# Patient Record
Sex: FEMALE | Race: WHITE | Employment: FULL TIME | URBAN - METROPOLITAN AREA
[De-identification: names, ages, dates, MRNs, and addresses within clinical notes are randomized per-mention and may not be internally consistent; named-entity substitution may affect disease eponyms.]

---

## 2018-05-15 ENCOUNTER — TELEPHONE (OUTPATIENT)
Dept: SLEEP MEDICINE | Age: 48
End: 2018-05-15

## 2018-06-01 ENCOUNTER — TELEPHONE (OUTPATIENT)
Dept: NEUROLOGY | Age: 48
End: 2018-06-01

## 2018-06-01 ENCOUNTER — OFFICE VISIT (OUTPATIENT)
Dept: NEUROLOGY | Age: 48
End: 2018-06-01

## 2018-06-01 VITALS
HEART RATE: 70 BPM | OXYGEN SATURATION: 97 % | BODY MASS INDEX: 23.25 KG/M2 | WEIGHT: 157 LBS | HEIGHT: 69 IN | SYSTOLIC BLOOD PRESSURE: 112 MMHG | DIASTOLIC BLOOD PRESSURE: 68 MMHG

## 2018-06-01 DIAGNOSIS — G93.31 POSTVIRAL FATIGUE SYNDROME: Primary | ICD-10-CM

## 2018-06-01 DIAGNOSIS — L93.0 LUPUS ERYTHEMATOSUS, UNSPECIFIED FORM: ICD-10-CM

## 2018-06-01 DIAGNOSIS — M79.7 FIBROMYALGIA: ICD-10-CM

## 2018-06-01 RX ORDER — MINERAL OIL
ENEMA (ML) RECTAL
COMMUNITY

## 2018-06-01 RX ORDER — NAPROXEN 250 MG/1
TABLET ORAL 2 TIMES DAILY WITH MEALS
COMMUNITY
End: 2019-03-15

## 2018-06-01 NOTE — LETTER
6/1/2018 1:56 PM 
 
Patient:  Mingo Plolard YOB: 1970 Date of Visit: 6/1/2018 Dear Samuel Sprague MD 
[address] [place] [address] [phone] VIA In Basket 
 : Thank you for referring Ms. Flavia Morris to me for evaluation/treatment. Below are the relevant portions of my assessment and plan of care. HISTORY OF PRESENT ILLNESS Mingo Pollard is a 50 y.o. female. HPI Comments: This patient is a 73-year-old right-handed  female who comes in today for extreme fatigue. She had a presumed viral gastritis about 1 month ago and now states she could sleep 20 hours a day if allowed to. 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 She used to have a lot of trouble falling asleep but now the moment she puts her head on the pillow she falls asleep. She sits down during the day she will fall asleep. She feels like her head is heavy all the time. She has a diagnosis of fibromyalgia, is on Celebrex for that. He is  she has 3 teenage children. She carries a diagnosis of lupus. New Patient The history is provided by the patient. This is a new problem. Review of Systems Constitutional:  
     Review of systems is positive for chest pain occasionally, depression, she just lost her mother and she is somewhat depressed but denies severe depression. Fatigue as noted above, occasional shortness of breath and occasional snoring. Complete review of systems done all others negative Current Outpatient Prescriptions on File Prior to Visit Medication Sig Dispense Refill  QUERCETIN DIHYDRATE, BULK, by Does Not Apply route.  b complex vitamins (B COMPLEX 1) tablet Take 1 Tab by mouth daily.  ASCORBATE CALCIUM (VITAMIN C PO) Take  by mouth.  nortriptyline (PAMELOR) 50 mg capsule  ascorbic acid (VITAMIN C) 250 mg tablet Take  by mouth.  celecoxib (CELEBREX) 200 mg capsule   5  
 Cetirizine (ZYRTEC) 10 mg cap Take  by mouth.  multivitamin (ONE A DAY) tablet Take 1 Tab by mouth daily.  hydroxychloroquine (PLAQUENIL) 200 mg tablet Take 200 mg by mouth two (2) times a day. No current facility-administered medications on file prior to visit. Past Medical History:  
Diagnosis Date  Fibromyalgia  Lupus  Ovarian cyst   
 
Family History Problem Relation Age of Onset  Parkinson's Disease Mother [employer] Other Mother   
  fibromyalgia  Dementia Mother  Neuropathy Mother  Stroke Mother  Migraines Sister  Heart Disease Brother  Stroke Brother  Cancer Maternal Grandfather   
  lung  Heart Disease Maternal Grandfather  Stroke Maternal Grandfather  Cancer Paternal Grandmother   
  testicular  Cancer Paternal Grandfather   
  ovarian Social History Substance Use Topics  Smoking status: Never Smoker  Smokeless tobacco: Never Used  Alcohol use 2.4 oz/week 4 Standard drinks or equivalent per week /68  Pulse 70  Ht 5' 9\" (1.753 m)  Wt 157 lb (71.2 kg)  SpO2 97%  BMI 23.18 kg/m2 Physical Exam 
Constitutional: Oriented to person, place, and time, appears well-developed and well-nourished. No distress. HENT:  
Head: Normocephalic and atraumatic. Mouth/Throat: Oropharynx is clear and moist. No oropharyngeal exudate. Eyes: Conjunctivae and EOM are normal. Pupils are equal, round, and reactive to light. No scleral icterus. Neck: Normal range of motion. Neck supple. No thyromegaly present. Cardiovascular: Normal rate, regular rhythm and normal heart sounds. Musculoskeletal: Normal range of motion, exhibits no edema, tenderness or deformity. Lymphadenopathy: no cervical adenopathy. Neurological: Alert and oriented to person, place, and time. Normal strength and normal reflexes. Displays no atrophy and no tremor. No cranial nerve deficit or sensory deficit. Exhibits normal muscle tone.   Displays a negative Romberg sign, no seizure activity. Coordination normal, gait normal.  
No Babinski's sign on the right side. No Babinski's sign on the left side. Speech, language and mentation are normal 
Visual fields are full to confrontation, funduscopic exam reveals flat discs, the retina and vasculature are normal  
Skin: Skin is warm and dry. No rash noted, not diaphoretic. No erythema. Psychiatric: Normal mood and affect,  behavior is normal. Judgment and thought content normal.  
Vitals reviewed. ASSESSMENT and PLAN 
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 She is scheduled for a sleep study but it is not for 3 months. I am going to try to have my office call and see if we can get that moved up a little bit. I will check a sedimentation rate, ANEL and a myasthenia panel and a CK. I do not get a flavor from her of an actual sleep disorder but I would like to make sure we are not dealing with any alteration of progression into stage IV sleep. This patient is miserable and if she does not improve significantly over the next 15 days I will consider putting her on low-dose amphetamines during the day. I am  sure she is willing to take them and I did not bring it up in the office today. She carries a diagnosis of fibromyalgia but she does not seem to have a fibromyalgia personality. I have asked her to call us to go over her lab work in the middle of next week. At that time I should know when her sleep study is scheduled for. I have an appointment back to see her in 2 months. LUPUS Obvious this patient has some degree of autoimmune disease. I suspect that her predisposition towards autoimmune diseases at the root of the current problem. This note will not be viewable in [address] If you have questions, please do not hesitate to call me. I look forward to following Ms. Lucho Gr along with you. Sincerely, Amari Harden MD

## 2018-06-01 NOTE — TELEPHONE ENCOUNTER
Spoke with patient to let her known that Dr. Josh Danielle ordered another lab and I will mailing it to her. Patient understood.

## 2018-06-01 NOTE — PROGRESS NOTES
HISTORY OF PRESENT ILLNESS  Flavia Toussaint is a 50 y.o. female. HPI Comments: This patient is a 73-year-old right-handed  female who comes in today for extreme fatigue. She had a presumed viral gastritis about 1 month ago and now states she could sleep 20 hours a day if allowed to. 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 She used to have a lot of trouble falling asleep but now the moment she puts her head on the pillow she falls asleep. She sits down during the day she will fall asleep. She feels like her head is heavy all the time. She has a diagnosis of fibromyalgia, is on Celebrex for that. He is  she has 3 teenage children. She carries a diagnosis of lupus. New Patient   The history is provided by the patient. This is a new problem. Review of Systems   Constitutional:        Review of systems is positive for chest pain occasionally, depression, she just lost her mother and she is somewhat depressed but denies severe depression. Fatigue as noted above, occasional shortness of breath and occasional snoring. Complete review of systems done all others negative     Current Outpatient Prescriptions on File Prior to Visit   Medication Sig Dispense Refill    QUERCETIN DIHYDRATE, BULK, by Does Not Apply route.  b complex vitamins (B COMPLEX 1) tablet Take 1 Tab by mouth daily.  ASCORBATE CALCIUM (VITAMIN C PO) Take  by mouth.  nortriptyline (PAMELOR) 50 mg capsule       ascorbic acid (VITAMIN C) 250 mg tablet Take  by mouth.  celecoxib (CELEBREX) 200 mg capsule   5    Cetirizine (ZYRTEC) 10 mg cap Take  by mouth.  multivitamin (ONE A DAY) tablet Take 1 Tab by mouth daily.  hydroxychloroquine (PLAQUENIL) 200 mg tablet Take 200 mg by mouth two (2) times a day. No current facility-administered medications on file prior to visit.       Past Medical History:   Diagnosis Date    Fibromyalgia     Lupus     Ovarian cyst      Family History Problem Relation Age of Onset    Parkinson's Disease Mother     Other Mother      fibromyalgia    Dementia Mother     Neuropathy Mother     Stroke Mother     Migraines Sister     Heart Disease Brother     Stroke Brother     Cancer Maternal Grandfather      lung    Heart Disease Maternal Grandfather     Stroke Maternal Grandfather     Cancer Paternal Grandmother      testicular    Cancer Paternal Grandfather      ovarian     Social History   Substance Use Topics    Smoking status: Never Smoker    Smokeless tobacco: Never Used    Alcohol use 2.4 oz/week     4 Standard drinks or equivalent per week     /68  Pulse 70  Ht 5' 9\" (1.753 m)  Wt 157 lb (71.2 kg)  SpO2 97%  BMI 23.18 kg/m2    Physical Exam  Constitutional: Oriented to person, place, and time, appears well-developed and well-nourished. No distress. HENT:   Head: Normocephalic and atraumatic. Mouth/Throat: Oropharynx is clear and moist. No oropharyngeal exudate. Eyes: Conjunctivae and EOM are normal. Pupils are equal, round, and reactive to light. No scleral icterus. Neck: Normal range of motion. Neck supple. No thyromegaly present. Cardiovascular: Normal rate, regular rhythm and normal heart sounds. Musculoskeletal: Normal range of motion, exhibits no edema, tenderness or deformity. Lymphadenopathy: no cervical adenopathy. Neurological: Alert and oriented to person, place, and time. Normal strength and normal reflexes. Displays no atrophy and no tremor. No cranial nerve deficit or sensory deficit. Exhibits normal muscle tone. Displays a negative Romberg sign, no seizure activity. Coordination normal, gait normal.   No Babinski's sign on the right side. No Babinski's sign on the left side. Speech, language and mentation are normal  Visual fields are full to confrontation, funduscopic exam reveals flat discs, the retina and vasculature are normal   Skin: Skin is warm and dry.  No rash noted, not diaphoretic. No erythema. Psychiatric: Normal mood and affect,  behavior is normal. Judgment and thought content normal.   Vitals reviewed. 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 She is scheduled for a sleep study but it is not for 3 months. I am going to try to have my office call and see if we can get that moved up a little bit. I will check a sedimentation rate, ANEL and a myasthenia panel and a CK. I do not get a flavor from her of an actual sleep disorder but I would like to make sure we are not dealing with any alteration of progression into stage IV sleep. This patient is miserable and if she does not improve significantly over the next 15 days I will consider putting her on low-dose amphetamines during the day. I am  sure she is willing to take them and I did not bring it up in the office today. She carries a diagnosis of fibromyalgia but she does not seem to have a fibromyalgia personality. I have asked her to call us to go over her lab work in the middle of next week. At that time I should know when her sleep study is scheduled for. I have an appointment back to see her in 2 months. LUPUS  Obvious this patient has some degree of autoimmune disease. I suspect that her predisposition towards autoimmune diseases at the root of the current problem. This note will not be viewable in [address]

## 2018-06-01 NOTE — MR AVS SNAPSHOT
[address], 
[address], [address] 
[phone] Patient: Raf Licona MRN: [id number] Visit Information Date & Time Provider Department Dept. Phone Encounter #  
 2018  1:00 PM Lavonne Woods MD [facility] at [facility] [phone] [id number] Your Appointments 2018 11:20 AM  
Follow Up with Lavonne Woods MD  
Neurology Clinic at [address]) Appt Note: f/u fatigue, jrb 18  
 [address] [place] [address], 
[address] [place] [address], [address]  
[phone]  
  
   
 [address] [place] [address], [address] [place] [address], [address] [place] [address] [phone]  
  
    
 2018  4:20 PM  
New Patient with Jeri Owen MD  
[address] [place] [address] ([address] [place] [address] Road) Appt Note: NP; referred by Dr. Latanya Vela;  
 [place] [address] [phone] [address] [facility] [address] [phone] Upcoming Health Maintenance Date Due DTaP/Tdap/Td series (1 - Tdap) 1989 PAP AKA CERVICAL CYTOLOGY 1989 Influenza Age 5 to Adult 2018 Allergies as of 2018  Review Complete On: 2018 By: Elda Abdul Severity Noted Reaction Type Reactions Latex, Natural Rubber  2012    Contact Dermatitis Adhesive  2015    Rash Morphine  2012    Rash Mushroom  2015    Rash, Other (comments) Rash and sore throat Peach Flavor  2015    Rash, Other (comments) Rash and sore throat Sulfa (Sulfonamide Antibiotics)  2012    Unknown (comments) Pt has lupus Tessalon [Benzonatate]  2015    Rash Tetracycline  2012    Unknown (comments) Pt has lupus Current Immunizations  Never Reviewed No immunizations on file. Not reviewed this visit You Were Diagnosed With   
  
 Codes Comments Postviral fatigue syndrome    -  Primary ICD-10-CM: G93.3 ICD-9-CM: 780.71 Fibromyalgia     ICD-10-CM: M79.7 ICD-9-CM: 729.1 Lupus erythematosus, unspecified form     ICD-10-CM: L93.0 ICD-9-CM: 695.4 Vitals BP Pulse Height(growth percentile) Weight(growth percentile) SpO2 BMI  
 112/68 70 5' 9\" (1.753 m) 157 lb (71.2 kg) 97% 23.18 kg/m2 OB Status Smoking Status Having regular periods Never Smoker BMI and BSA Data Body Mass Index Body Surface Area  
 23.18 kg/m 2 1.86 m 2 Preferred Pharmacy Pharmacy Name Phone [id number] [address] [phone] Your Updated Medication List  
  
   
This list is accurate as of 6/1/18  1:40 PM.  Always use your most recent med list.  
  
  
  
  
 ascorbic acid (vitamin C) 250 mg tablet Commonly known as:  VITAMIN C Take  by mouth. B COMPLEX 1 tablet Generic drug:  b complex vitamins Take 1 Tab by mouth daily. celecoxib 200 mg capsule Commonly known as:  CELEBREX  
  
 fexofenadine 180 mg tablet Commonly known as:  [place] Mayor Take  by mouth. GLUCOSAMINE PO Take  by mouth.  
  
 multivitamin tablet Commonly known as:  ONE A DAY Take 1 Tab by mouth daily. naproxen 250 mg tablet Commonly known as:  NAPROSYN Take  by mouth two (2) times daily (with meals). nortriptyline 50 mg capsule Commonly known as:  PAMELOR  
  
 PLAQUENIL 200 mg tablet Generic drug:  hydroxychloroquine Take 200 mg by mouth two (2) times a day. QUERCETIN DIHYDRATE (BULK)  
by Does Not Apply route. VITAMIN C PO Take  by mouth. VITAMIN D3 PO Take  by mouth. ZyrTEC 10 mg Cap Generic drug:  Cetirizine Take  by mouth. We Performed the Following CK [id number] CPT(R)] SED RATE (ESR) [id number] CPT(R)] T4, FREE [id number] CPT(R)] Patient Instructions Office Policies 
 
o Phone calls/patient messages: Please allow up to 24 hours for someone in the office to contact you about your call or message. Be mindful your provider may be out of the office or your message may require further review. We encourage you to use RealD for your messages as this is a faster, more efficient way to communicate with our office 
 
o Medication Refills: 
Prescription medications require up to 48 business hours to process. We encourage you to use RealD for your refills. For controlled medications: Please allow up to 72 business hours to process. Certain medications may require you to  a written prescription at our office. NO narcotic/controlled medications will be prescribed after 4pm Monday through Friday or on weekends 
 
o Form/Paperwork Completion: 
Please note there is a $25 fee for all paperwork completed by our providers. We ask that you allow 7-14 business days. Pre-payment is due prior to picking up/faxing the completed form. You may also download your forms to RealD to have your doctor print off. A Healthy Lifestyle: Care Instructions Your Care Instructions A healthy lifestyle can help you feel good, stay at a healthy weight, and have plenty of energy for both work and play. A healthy lifestyle is something you can share with your whole family. A healthy lifestyle also can lower your risk for serious health problems, such as high blood pressure, heart disease, and diabetes. You can follow a few steps listed below to improve your health and the health of your family. Follow-up care is a key part of your treatment and safety. Be sure to make and go to all appointments, and call your doctor if you are having problems. It's also a good idea to know your test results and keep a list of the medicines you take. How can you care for yourself at home? · Do not eat too much sugar, fat, or fast foods. You can still have dessert and treats now and then. The goal is moderation. · Start small to improve your eating habits. Pay attention to portion sizes, drink less juice and soda pop, and eat more fruits and vegetables. ¨ Eat a healthy amount of food. A 3-ounce serving of meat, for example, is about the size of a deck of cards. Fill the rest of your plate with vegetables and whole grains. ¨ Limit the amount of soda and sports drinks you have every day. Drink more water when you are thirsty. ¨ Eat at least 5 servings of fruits and vegetables every day. It may seem like a lot, but it is not hard to reach this goal. A serving or helping is 1 piece of fruit, 1 cup of vegetables, or 2 cups of leafy, raw vegetables. Have an apple or some carrot sticks as an afternoon snack instead of a candy bar. Try to have fruits and/or vegetables at every meal. 
· Make exercise part of your daily routine. You may want to start with simple activities, such as walking, bicycling, or slow swimming. Try to be active 30 to 60 minutes every day. You do not need to do all 30 to 60 minutes all at once. For example, you can exercise 3 times a day for 10 or 20 minutes. Moderate exercise is safe for most people, but it is always a good idea to talk to your doctor before starting an exercise program. 
· Keep moving. [employer] the lawn, work in the garden, or [employer]. Take the stairs instead of the elevator at work. · If you smoke, quit. People who smoke have an increased risk for heart attack, stroke, cancer, and other lung illnesses. Quitting is hard, but there are ways to boost your chance of quitting tobacco for good. ¨ Use nicotine gum, patches, or lozenges. ¨ Ask your doctor about stop-smoking programs and medicines. ¨ Keep trying.  
In addition to reducing your risk of diseases in the future, you will notice some benefits soon after you stop using tobacco. If you have shortness of breath or asthma symptoms, they will likely get better within a few weeks after you quit. · Limit how much alcohol you drink. Moderate amounts of alcohol (up to 2 drinks a day for men, 1 drink a day for women) are okay. But drinking too much can lead to liver problems, high blood pressure, and other health problems. Family health If you have a family, there are many things you can do together to improve your health. · Eat meals together as a family as often as possible. · Eat healthy foods. This includes fruits, vegetables, lean meats and dairy, and whole grains. · Include your family in your fitness plan. Most people think of activities such as jogging or tennis as the way to fitness, but there are many ways you and your family can be more active. Anything that makes you breathe hard and gets your heart pumping is exercise. Here are some tips: 
¨ Walk to do errands or to take your child to school or the bus. ¨ Go for a family bike ride after dinner instead of watching TV. Where can you learn more? Go to http://emilie-gabriella.info/. Enter M585 in the search box to learn more about \"A Healthy Lifestyle: Care Instructions. \" Current as of: May 12, 2017 Content Version: 11.4 © 1719-9266 Healthwise, [employer]. Care instructions adapted under license by [employer] (which disclaims liability or warranty for this information). If you have questions about a medical condition or this instruction, always ask your healthcare professional. [place] [address] any warranty or liability for your use of this information. Introducing [facility]! [facility] introduces Anywhere.FM patient portal. Now you can access parts of your medical record, email your doctor's office, and request medication refills online. 1. In your internet browser, go to [email or web address] 2. Click on the First Time User? Click Here link in the Sign In box. You will see the New Member Sign Up page. 3. Enter your Dissolve Access Code exactly as it appears below. You will not need to use this code after youve completed the sign-up process. If you do not sign up before the expiration date, you must request a new code. · Dissolve Access Code: [email or web address] Expires: 8/30/2018 12:58 PM 
 
4. Enter the last four digits of your Social Security Number (xxxx) and Date of Birth (mm/dd/yyyy) as indicated and click Submit. You will be taken to the next sign-up page. 5. Create a Dissolve ID. This will be your Dissolve login ID and cannot be changed, so think of one that is secure and easy to remember. 6. Create a Dissolve password. You can change your password at any time. 7. Enter your Password Reset Question and Answer. This can be used at a later time if you forget your password. 8. Enter your e-mail address. You will receive e-mail notification when new information is available in [address] 9. Click Sign Up. You can now view and download portions of your medical record. 10. Click the Download Summary menu link to download a portable copy of your medical information. If you have questions, please visit the Frequently Asked Questions section of the Dissolve website. Remember, Dissolve is NOT to be used for urgent needs. For medical emergencies, dial 911. Now available from your iPhone and Android! Please provide this summary of care documentation to your next provider. Your primary care clinician is listed as [facility]. If you have any questions after today's visit, please call [phone].

## 2018-06-01 NOTE — PATIENT INSTRUCTIONS
Office Policies    o Phone calls/patient messages:  Please allow up to 24 hours for someone in the office to contact you about your call or message. Be mindful your provider may be out of the office or your message may require further review. We encourage you to use [employer] for your messages as this is a faster, more efficient way to communicate with our office    o Medication Refills:  Prescription medications require up to 48 business hours to process. We encourage you to use [employer] for your refills. For controlled medications: Please allow up to 72 business hours to process. Certain medications may require you to  a written prescription at our office. NO narcotic/controlled medications will be prescribed after 4pm Monday through Friday or on weekends    o Form/Paperwork Completion:  Please note there is a $25 fee for all paperwork completed by our providers. We ask that you allow 7-14 business days. Pre-payment is due prior to picking up/faxing the completed form. You may also download your forms to [employer] to have your doctor print off. A Healthy Lifestyle: Care Instructions  Your Care Instructions    A healthy lifestyle can help you feel good, stay at a healthy weight, and have plenty of energy for both work and play. A healthy lifestyle is something you can share with your whole family. A healthy lifestyle also can lower your risk for serious health problems, such as high blood pressure, heart disease, and diabetes. You can follow a few steps listed below to improve your health and the health of your family. Follow-up care is a key part of your treatment and safety. Be sure to make and go to all appointments, and call your doctor if you are having problems. It's also a good idea to know your test results and keep a list of the medicines you take. How can you care for yourself at home? · Do not eat too much sugar, fat, or fast foods. You can still have dessert and treats now and then. The goal is moderation. · Start small to improve your eating habits. Pay attention to portion sizes, drink less juice and soda pop, and eat more fruits and vegetables. ¨ Eat a healthy amount of food. A 3-ounce serving of meat, for example, is about the size of a deck of cards. Fill the rest of your plate with vegetables and whole grains. ¨ Limit the amount of soda and sports drinks you have every day. Drink more water when you are thirsty. ¨ Eat at least 5 servings of fruits and vegetables every day. It may seem like a lot, but it is not hard to reach this goal. A serving or helping is 1 piece of fruit, 1 cup of vegetables, or 2 cups of leafy, raw vegetables. Have an apple or some carrot sticks as an afternoon snack instead of a candy bar. Try to have fruits and/or vegetables at every meal.  · Make exercise part of your daily routine. You may want to start with simple activities, such as walking, bicycling, or slow swimming. Try to be active 30 to 60 minutes every day. You do not need to do all 30 to 60 minutes all at once. For example, you can exercise 3 times a day for 10 or 20 minutes. Moderate exercise is safe for most people, but it is always a good idea to talk to your doctor before starting an exercise program.  · Keep moving. Howard Rehman the lawn, work in the garden, or [employer]. Take the stairs instead of the elevator at work. · If you smoke, quit. People who smoke have an increased risk for heart attack, stroke, cancer, and other lung illnesses. Quitting is hard, but there are ways to boost your chance of quitting tobacco for good. ¨ Use nicotine gum, patches, or lozenges. ¨ Ask your doctor about stop-smoking programs and medicines. ¨ Keep trying. In addition to reducing your risk of diseases in the future, you will notice some benefits soon after you stop using tobacco. If you have shortness of breath or asthma symptoms, they will likely get better within a few weeks after you quit.   · Limit how much alcohol you drink. Moderate amounts of alcohol (up to 2 drinks a day for men, 1 drink a day for women) are okay. But drinking too much can lead to liver problems, high blood pressure, and other health problems. Family health  If you have a family, there are many things you can do together to improve your health. · Eat meals together as a family as often as possible. · Eat healthy foods. This includes fruits, vegetables, lean meats and dairy, and whole grains. · Include your family in your fitness plan. Most people think of activities such as jogging or tennis as the way to fitness, but there are many ways you and your family can be more active. Anything that makes you breathe hard and gets your heart pumping is exercise. Here are some tips:  ¨ Walk to do errands or to take your child to school or the bus. ¨ Go for a family bike ride after dinner instead of watching TV. Where can you learn more? Go to http://emilie-gabriella.info/. Enter Z004 in the search box to learn more about \"A Healthy Lifestyle: Care Instructions. \"  Current as of: May 12, 2017  Content Version: 11.4  © 8348-8526 [employer]. Care instructions adapted under license by [employer] (which disclaims liability or warranty for this information). If you have questions about a medical condition or this instruction, always ask your healthcare professional. [address] any warranty or liability for your use of this information.

## 2018-06-02 LAB
CK SERPL-CCNC: 60 U/L (ref 24–173)
ERYTHROCYTE [SEDIMENTATION RATE] IN BLOOD BY WESTERGREN METHOD: 2 MM/HR (ref 0–32)
T4 FREE SERPL-MCNC: 1.21 NG/DL (ref 0.82–1.77)

## 2018-06-12 LAB
ACHR BIND AB SER-SCNC: <0.03 NMOL/L (ref 0–0.24)
STRIA MUS AB TITR SER IF: NEGATIVE {TITER}

## 2018-08-06 ENCOUNTER — HOSPITAL ENCOUNTER (OUTPATIENT)
Dept: ULTRASOUND IMAGING | Age: 48
Discharge: HOME | End: 2018-08-06
Attending: FAMILY MEDICINE
Payer: COMMERCIAL

## 2018-08-06 DIAGNOSIS — R60.0 PEDAL EDEMA: ICD-10-CM

## 2018-08-06 PROCEDURE — 93971 EXTREMITY STUDY: CPT

## 2019-03-15 ENCOUNTER — APPOINTMENT (OUTPATIENT)
Dept: CT IMAGING | Age: 49
End: 2019-03-15
Attending: PHYSICIAN ASSISTANT
Payer: COMMERCIAL

## 2019-03-15 ENCOUNTER — APPOINTMENT (OUTPATIENT)
Dept: ULTRASOUND IMAGING | Age: 49
End: 2019-03-15
Payer: COMMERCIAL

## 2019-03-15 ENCOUNTER — OFFICE VISIT (OUTPATIENT)
Dept: URGENT CARE | Age: 49
End: 2019-03-15

## 2019-03-15 ENCOUNTER — HOSPITAL ENCOUNTER (EMERGENCY)
Age: 49
Discharge: HOME | End: 2019-03-15
Attending: EMERGENCY MEDICINE
Payer: COMMERCIAL

## 2019-03-15 VITALS
DIASTOLIC BLOOD PRESSURE: 63 MMHG | HEART RATE: 66 BPM | OXYGEN SATURATION: 99 % | BODY MASS INDEX: 24.4 KG/M2 | RESPIRATION RATE: 18 BRPM | TEMPERATURE: 97.9 F | WEIGHT: 161 LBS | SYSTOLIC BLOOD PRESSURE: 138 MMHG | HEIGHT: 68 IN

## 2019-03-15 VITALS
BODY MASS INDEX: 24.39 KG/M2 | TEMPERATURE: 98.6 F | SYSTOLIC BLOOD PRESSURE: 128 MMHG | DIASTOLIC BLOOD PRESSURE: 58 MMHG | RESPIRATION RATE: 18 BRPM | HEIGHT: 68 IN | WEIGHT: 160.94 LBS | HEART RATE: 65 BPM | OXYGEN SATURATION: 97 %

## 2019-03-15 DIAGNOSIS — K59.00 CONSTIPATION, UNSPECIFIED CONSTIPATION TYPE: ICD-10-CM

## 2019-03-15 DIAGNOSIS — R10.11 ABDOMINAL PAIN, RIGHT UPPER QUADRANT: Primary | ICD-10-CM

## 2019-03-15 DIAGNOSIS — R10.9 ABDOMINAL PAIN, UNSPECIFIED ABDOMINAL LOCATION: Primary | ICD-10-CM

## 2019-03-15 LAB
ALBUMIN SERPL-MCNC: 4 G/DL (ref 3.5–5)
ALBUMIN/GLOB SERPL: 1.2 {RATIO} (ref 1.1–2.2)
ALP SERPL-CCNC: 59 U/L (ref 45–117)
ALT SERPL-CCNC: 30 U/L (ref 12–78)
ANION GAP SERPL CALC-SCNC: 7 MMOL/L (ref 5–15)
APPEARANCE UR: CLEAR
AST SERPL-CCNC: 20 U/L (ref 15–37)
BACTERIA URNS QL MICRO: NEGATIVE /HPF
BASOPHILS # BLD: 0.1 K/UL (ref 0–0.1)
BASOPHILS NFR BLD: 1 % (ref 0–1)
BILIRUB SERPL-MCNC: 0.4 MG/DL (ref 0.2–1)
BILIRUB UR QL CFM: NEGATIVE
BUN SERPL-MCNC: 12 MG/DL (ref 6–20)
BUN/CREAT SERPL: 16 (ref 12–20)
CALCIUM SERPL-MCNC: 8.8 MG/DL (ref 8.5–10.1)
CHLORIDE SERPL-SCNC: 106 MMOL/L (ref 97–108)
CO2 SERPL-SCNC: 25 MMOL/L (ref 21–32)
COLOR UR: ABNORMAL
CREAT SERPL-MCNC: 0.76 MG/DL (ref 0.55–1.02)
DIFFERENTIAL METHOD BLD: ABNORMAL
EOSINOPHIL # BLD: 0 K/UL (ref 0–0.4)
EOSINOPHIL NFR BLD: 0 % (ref 0–7)
EPITH CASTS URNS QL MICRO: ABNORMAL /LPF
ERYTHROCYTE [DISTWIDTH] IN BLOOD BY AUTOMATED COUNT: 12.6 % (ref 11.5–14.5)
GLOBULIN SER CALC-MCNC: 3.4 G/DL (ref 2–4)
GLUCOSE SERPL-MCNC: 100 MG/DL (ref 65–100)
GLUCOSE UR STRIP.AUTO-MCNC: NEGATIVE MG/DL
HCT VFR BLD AUTO: 42.6 % (ref 35–47)
HGB BLD-MCNC: 13.9 G/DL (ref 11.5–16)
HGB UR QL STRIP: NEGATIVE
IMM GRANULOCYTES # BLD AUTO: 0.1 K/UL (ref 0–0.04)
IMM GRANULOCYTES NFR BLD AUTO: 1 % (ref 0–0.5)
KETONES UR QL STRIP.AUTO: ABNORMAL MG/DL
LEUKOCYTE ESTERASE UR QL STRIP.AUTO: ABNORMAL
LIPASE SERPL-CCNC: 111 U/L (ref 73–393)
LYMPHOCYTES # BLD: 0.8 K/UL (ref 0.8–3.5)
LYMPHOCYTES NFR BLD: 6 % (ref 12–49)
MCH RBC QN AUTO: 28.7 PG (ref 26–34)
MCHC RBC AUTO-ENTMCNC: 32.6 G/DL (ref 30–36.5)
MCV RBC AUTO: 87.8 FL (ref 80–99)
MONOCYTES # BLD: 0.5 K/UL (ref 0–1)
MONOCYTES NFR BLD: 4 % (ref 5–13)
MUCOUS THREADS URNS QL MICRO: ABNORMAL /LPF
NEUTS SEG # BLD: 11.3 K/UL (ref 1.8–8)
NEUTS SEG NFR BLD: 88 % (ref 32–75)
NITRITE UR QL STRIP.AUTO: NEGATIVE
NRBC # BLD: 0.02 K/UL (ref 0–0.01)
NRBC BLD-RTO: 0.2 PER 100 WBC
PH UR STRIP: 6.5 [PH] (ref 5–8)
PLATELET # BLD AUTO: 244 K/UL (ref 150–400)
PMV BLD AUTO: 9.7 FL (ref 8.9–12.9)
POTASSIUM SERPL-SCNC: 3.5 MMOL/L (ref 3.5–5.1)
PROT SERPL-MCNC: 7.4 G/DL (ref 6.4–8.2)
PROT UR STRIP-MCNC: NEGATIVE MG/DL
RBC # BLD AUTO: 4.85 M/UL (ref 3.8–5.2)
RBC #/AREA URNS HPF: ABNORMAL /HPF (ref 0–5)
RBC MORPH BLD: ABNORMAL
SODIUM SERPL-SCNC: 138 MMOL/L (ref 136–145)
SP GR UR REFRACTOMETRY: 1.03 (ref 1–1.03)
UR CULT HOLD, URHOLD: NORMAL
UROBILINOGEN UR QL STRIP.AUTO: 1 EU/DL (ref 0.2–1)
WBC # BLD AUTO: 12.8 K/UL (ref 3.6–11)
WBC URNS QL MICRO: ABNORMAL /HPF (ref 0–4)

## 2019-03-15 PROCEDURE — 81001 URINALYSIS AUTO W/SCOPE: CPT

## 2019-03-15 PROCEDURE — 80053 COMPREHEN METABOLIC PANEL: CPT

## 2019-03-15 PROCEDURE — 74011250637 HC RX REV CODE- 250/637: Performed by: PHYSICIAN ASSISTANT

## 2019-03-15 PROCEDURE — 96374 THER/PROPH/DIAG INJ IV PUSH: CPT

## 2019-03-15 PROCEDURE — 99285 EMERGENCY DEPT VISIT HI MDM: CPT

## 2019-03-15 PROCEDURE — 74177 CT ABD & PELVIS W/CONTRAST: CPT

## 2019-03-15 PROCEDURE — 96376 TX/PRO/DX INJ SAME DRUG ADON: CPT

## 2019-03-15 PROCEDURE — 85025 COMPLETE CBC W/AUTO DIFF WBC: CPT

## 2019-03-15 PROCEDURE — 74011250636 HC RX REV CODE- 250/636: Performed by: PHYSICIAN ASSISTANT

## 2019-03-15 PROCEDURE — 99284 EMERGENCY DEPT VISIT MOD MDM: CPT

## 2019-03-15 PROCEDURE — 74011636320 HC RX REV CODE- 636/320: Performed by: PHYSICIAN ASSISTANT

## 2019-03-15 PROCEDURE — 83690 ASSAY OF LIPASE: CPT

## 2019-03-15 PROCEDURE — 96375 TX/PRO/DX INJ NEW DRUG ADDON: CPT

## 2019-03-15 PROCEDURE — 76705 ECHO EXAM OF ABDOMEN: CPT

## 2019-03-15 PROCEDURE — 36415 COLL VENOUS BLD VENIPUNCTURE: CPT

## 2019-03-15 RX ORDER — KETOROLAC TROMETHAMINE 30 MG/ML
30 INJECTION, SOLUTION INTRAMUSCULAR; INTRAVENOUS
Status: COMPLETED | OUTPATIENT
Start: 2019-03-15 | End: 2019-03-15

## 2019-03-15 RX ORDER — HYDROMORPHONE HYDROCHLORIDE 1 MG/ML
0.5 INJECTION, SOLUTION INTRAMUSCULAR; INTRAVENOUS; SUBCUTANEOUS ONCE
Status: COMPLETED | OUTPATIENT
Start: 2019-03-15 | End: 2019-03-15

## 2019-03-15 RX ORDER — ONDANSETRON 2 MG/ML
4 INJECTION INTRAMUSCULAR; INTRAVENOUS
Status: DISCONTINUED | OUTPATIENT
Start: 2019-03-15 | End: 2019-03-16 | Stop reason: HOSPADM

## 2019-03-15 RX ORDER — ONDANSETRON 4 MG/1
4 TABLET, ORALLY DISINTEGRATING ORAL
Qty: 1 TAB | Refills: 0 | Status: SHIPPED | COMMUNITY
Start: 2019-03-15 | End: 2019-03-15

## 2019-03-15 RX ORDER — METOCLOPRAMIDE HYDROCHLORIDE 5 MG/ML
10 INJECTION INTRAMUSCULAR; INTRAVENOUS
Status: COMPLETED | OUTPATIENT
Start: 2019-03-15 | End: 2019-03-15

## 2019-03-15 RX ORDER — HYDROMORPHONE HYDROCHLORIDE 1 MG/ML
0.2 INJECTION, SOLUTION INTRAMUSCULAR; INTRAVENOUS; SUBCUTANEOUS
Status: COMPLETED | OUTPATIENT
Start: 2019-03-15 | End: 2019-03-15

## 2019-03-15 RX ORDER — HYDROMORPHONE HYDROCHLORIDE 1 MG/ML
1 INJECTION, SOLUTION INTRAMUSCULAR; INTRAVENOUS; SUBCUTANEOUS ONCE
Status: DISCONTINUED | OUTPATIENT
Start: 2019-03-15 | End: 2019-03-15

## 2019-03-15 RX ORDER — FENTANYL CITRATE 50 UG/ML
75 INJECTION, SOLUTION INTRAMUSCULAR; INTRAVENOUS
Status: DISCONTINUED | OUTPATIENT
Start: 2019-03-15 | End: 2019-03-16 | Stop reason: HOSPADM

## 2019-03-15 RX ORDER — DICYCLOMINE HYDROCHLORIDE 20 MG/1
20 TABLET ORAL EVERY 6 HOURS
Qty: 20 TAB | Refills: 0 | Status: SHIPPED | OUTPATIENT
Start: 2019-03-15 | End: 2019-03-20

## 2019-03-15 RX ORDER — DOCUSATE SODIUM 100 MG/1
100 CAPSULE, LIQUID FILLED ORAL 2 TIMES DAILY
Qty: 28 CAP | Refills: 0 | Status: SHIPPED | OUTPATIENT
Start: 2019-03-15 | End: 2019-03-29

## 2019-03-15 RX ORDER — SODIUM CHLORIDE 0.9 % (FLUSH) 0.9 %
10 SYRINGE (ML) INJECTION
Status: COMPLETED | OUTPATIENT
Start: 2019-03-15 | End: 2019-03-15

## 2019-03-15 RX ORDER — DICYCLOMINE HYDROCHLORIDE 10 MG/1
20 CAPSULE ORAL
Status: COMPLETED | OUTPATIENT
Start: 2019-03-15 | End: 2019-03-15

## 2019-03-15 RX ORDER — PROMETHAZINE HYDROCHLORIDE 25 MG/1
25 TABLET ORAL
Qty: 12 TAB | Refills: 0 | Status: SHIPPED | OUTPATIENT
Start: 2019-03-15

## 2019-03-15 RX ORDER — OXYCODONE AND ACETAMINOPHEN 5; 325 MG/1; MG/1
1 TABLET ORAL
Qty: 12 TAB | Refills: 0 | Status: SHIPPED | OUTPATIENT
Start: 2019-03-15 | End: 2019-03-18

## 2019-03-15 RX ORDER — SODIUM CHLORIDE 9 MG/ML
1000 INJECTION, SOLUTION INTRAVENOUS CONTINUOUS
Status: DISCONTINUED | OUTPATIENT
Start: 2019-03-15 | End: 2019-03-16 | Stop reason: HOSPADM

## 2019-03-15 RX ADMIN — Medication 10 ML: at 20:53

## 2019-03-15 RX ADMIN — HYDROMORPHONE HYDROCHLORIDE 0.5 MG: 1 INJECTION, SOLUTION INTRAMUSCULAR; INTRAVENOUS; SUBCUTANEOUS at 19:35

## 2019-03-15 RX ADMIN — HYDROMORPHONE HYDROCHLORIDE 0.2 MG: 1 INJECTION, SOLUTION INTRAMUSCULAR; INTRAVENOUS; SUBCUTANEOUS at 17:53

## 2019-03-15 RX ADMIN — KETOROLAC TROMETHAMINE 30 MG: 30 INJECTION, SOLUTION INTRAMUSCULAR; INTRAVENOUS at 20:13

## 2019-03-15 RX ADMIN — DICYCLOMINE HYDROCHLORIDE 20 MG: 10 CAPSULE ORAL at 22:58

## 2019-03-15 RX ADMIN — IOPAMIDOL 100 ML: 755 INJECTION, SOLUTION INTRAVENOUS at 20:53

## 2019-03-15 RX ADMIN — METOCLOPRAMIDE 10 MG: 5 INJECTION, SOLUTION INTRAMUSCULAR; INTRAVENOUS at 17:53

## 2019-03-15 RX ADMIN — SODIUM CHLORIDE 1000 ML: 900 INJECTION, SOLUTION INTRAVENOUS at 19:35

## 2019-03-15 NOTE — ED NOTES
Patient presents to ED with complaints of right upper abdominal pain that started suddenly this morning. Patient went to urgent care and was sent to ED for gallbladder evaluation. Patient reports n/v/d. Denies urinary symptoms/blood in her urine. Patient reports hx of appendectomy. Patient states she is actively vomiting.      Last PO intake around 0800 this morning

## 2019-03-15 NOTE — ED NOTES
Bedside and Verbal shift change report given to Franky Torres RN (oncoming nurse) by Estephania Rios RN (offgoing nurse). Report included the following information SBAR, Kardex, ED Summary, MAR, Accordion and Recent Results.

## 2019-03-15 NOTE — PROGRESS NOTES
Pt was at rest and had sudden onset of pain in the Rt upper abd and with radiation in the Rt flank and some pain in the Rt LQ with NV. No injury, no FC, Some relief with voiding. No history of this in the past      The history is provided by the patient. Abdominal Pain    The history is provided by the patient. This is a new problem. The current episode started 3 to 5 hours ago. The problem has not changed (waxes and wanes) since onset. The pain is associated with vomiting. The pain is severe. Associated symptoms include nausea, vomiting and back pain (rt flank pain). Pertinent negatives include no fever, no belching, no diarrhea, no flatus, no dysuria, no frequency, no hematuria, no headaches and no trauma. Nothing worsens the pain. The pain is relieved by nothing. The patient's surgical history includes appendectomy. Vomiting    Associated symptoms include abdominal pain. Pertinent negatives include no fever, no diarrhea, no headaches and no headaches.         Past Medical History:   Diagnosis Date    Fibromyalgia     Lupus     Ovarian cyst         Past Surgical History:   Procedure Laterality Date    HX APPENDECTOMY  2005    HX BLADDER SUSPENSION  2011    HX BREAST AUGMENTATION  2009    HX BREAST BIOPSY Bilateral 2006    neg    HX CYST INCISION AND DRAINAGE Bilateral 1996    neg    HX OVARIAN CYST REMOVAL  1990    HX TONSILLECTOMY      IMPLANT BREAST SILICONE/EQ Bilateral 0872    US GUIDED CORE BREAST BIOPSY Left 2015    neg    US GUIDED CORE BREAST BIOPSY Left 2012    neg         Family History   Problem Relation Age of Onset    Parkinson's Disease Mother     Other Mother         fibromyalgia    Dementia Mother     Neuropathy Mother     Stroke Mother     Migraines Sister     Heart Disease Brother     Stroke Brother     Cancer Maternal Grandfather         lung    Heart Disease Maternal Grandfather     Stroke Maternal Grandfather     Cancer Paternal Grandmother         testicular    Cancer Paternal Grandfather         ovarian        Social History     Socioeconomic History    Marital status:      Spouse name: Not on file    Number of children: Not on file    Years of education: Not on file    Highest education level: Not on file   Social Needs    Financial resource strain: Not on file    Food insecurity - worry: Not on file    Food insecurity - inability: Not on file   [employer] needs - medical: Not on file   [employer] needs - non-medical: Not on file   Occupational History    Not on file   Tobacco Use    Smoking status: Never Smoker    Smokeless tobacco: Never Used   Substance and Sexual Activity    Alcohol use: Yes     Alcohol/week: 2.4 oz     Types: 4 Standard drinks or equivalent per week    Drug use: Not on file    Sexual activity: Not on file   Other Topics Concern    Not on file   Social History Narrative    Not on file                ALLERGIES: Latex, natural rubber; Adhesive; Morphine; Mushroom; Peach flavor; Sulfa (sulfonamide antibiotics); Tessalon [benzonatate]; and Tetracycline    Review of Systems   Constitutional: Negative. Negative for fever. Cardiovascular: Negative. Gastrointestinal: Positive for abdominal distention (abd bloating ), abdominal pain, nausea and vomiting. Negative for diarrhea and flatus. Genitourinary: Positive for flank pain. Negative for difficulty urinating, dyspareunia, dysuria, frequency and hematuria. Musculoskeletal: Positive for back pain (rt flank pain). Skin: Negative. Neurological: Negative for dizziness and headaches. Vitals:    03/15/19 1447   BP: 138/63   Pulse: 66   Resp: 18   Temp: 97.9 °F (36.6 °C)   SpO2: 99%   Weight: 161 lb (73 kg)   Height: 5' 8\" (1.727 m)       Physical Exam   Constitutional: She is oriented to person, place, and time. She appears well-developed and well-nourished. uncomfortable   HENT:   Head: Normocephalic and atraumatic. Mouth/Throat: No oropharyngeal exudate. Eyes: Conjunctivae and EOM are normal. Right eye exhibits no discharge. Left eye exhibits no discharge. No scleral icterus. Cardiovascular: Normal rate, regular rhythm and normal heart sounds. No murmur heard. Pulmonary/Chest: Effort normal and breath sounds normal. No respiratory distress. She has no wheezes. She has no rales. Abdominal: Soft. Bowel sounds are normal. She exhibits no distension. There is tenderness. There is no rebound and no guarding. RLQ pain with guarding. No CVA tenderness. RUQ tenderness without guarding. No masses. No left sided tenderness   Neurological: She is alert and oriented to person, place, and time. No cranial nerve deficit. Coordination normal.   Skin: Skin is warm. No erythema. Psychiatric: She has a normal mood and affect. Her behavior is normal. Judgment and thought content normal.   Nursing note and vitals reviewed. MDM     Differential Diagnosis; Clinical Impression; Plan:     Call to Dr Allison Espana at [facility] at 0897 and aware of pt and the concerns here      ICD-10-CM ICD-9-CM    1. Abdominal pain, unspecified abdominal location R10.9 789.00 CANCELED: AMB POC URINE PREGNANCY TEST, VISUAL COLOR COMPARISON      CANCELED: AMB POC URINALYSIS DIP STICK AUTO W/O MICRO     Medications Ordered Today   Medications    ondansetron (ZOFRAN ODT) 4 mg disintegrating tablet     Sig: Take 1 Tab by mouth now for 1 dose. Dispense:  1 Tab     Refill:  0     Order Specific Question:   Expiration Date     Answer:   4/30/2021     Order Specific Question:   Lot#     Answer:   [id number]     Order Specific Question:        Answer: [employer]     Order Specific Question:   NDC#     Answer:   [id number]     The pt is to go to the ER.                 Procedures

## 2019-03-15 NOTE — ED PROVIDER NOTES
EXPRESS CARE NOTE:  5:40 PM  I have seen and evaluated this patient in the Express Care portion of triage for severe RUQ pain and intractable N/V. The patients care will begin now and orders have been placed. This patient will be seen and provided further care in the Emergency Room. LILA Otto      EMERGENCY DEPARTMENT HISTORY AND PHYSICAL EXAM      Date: 3/15/2019  Patient Name: Micky Lerma    History of Presenting Illness     Chief Complaint   Patient presents with    Nausea    Vomiting       History Provided By: Patient    HPI: Micky Lerma, 46 y.o. female with PMHx significant for fibromyalgia, lupus, ovarian cyst, appendectomy, presents to the ED with cc of severe right upper quadrant abdominal pain onset at 11:00 today. The pain has been intermittent but has gradually become more severe throughout the day. Pain radiates to her right flank. There are no modifying factors. She has had associated nausea and numerous episodes of vomiting. She was seen at an urgent care facility and referred to the emergency department for further evaluation. She has never had pain like this before. She reports more frequent bowel movements over the last few days but denies diarrhea or constipation. She denies fever, urinary symptoms, CP, SOB, cough. She was treated for a UTI about a week ago with improvement in dysuria. PCP: Efren Saldana MD    There are no other complaints, changes, or physical findings at this time. Current Facility-Administered Medications   Medication Dose Route Frequency Provider Last Rate Last Dose    0.9% sodium chloride infusion 1,000 mL  1,000 mL IntraVENous CONTINUOUS Giovanny Welsh, [address]        HYDROmorphone (PF) (DILAUDID) injection 0.5 mg  0.5 mg IntraVENous ONCE Giovanny Welsh [address]         Current Outpatient Medications   Medication Sig Dispense Refill    ondansetron (ZOFRAN ODT) 4 mg disintegrating tablet Take 1 Tab by mouth now for 1 dose.  1 Tab 0    cholecalciferol, vitamin D3, (VITAMIN D3 PO) Take  by mouth.  fexofenadine (ALLEGRA) 180 mg tablet Take  by mouth.  glucosamine sulfate (GLUCOSAMINE PO) Take  by mouth.  QUERCETIN DIHYDRATE, BULK, by Does Not Apply route.  ascorbic acid (VITAMIN C) 250 mg tablet Take  by mouth.  Cetirizine (ZYRTEC) 10 mg cap Take  by mouth.  b complex vitamins (B COMPLEX 1) tablet Take 1 Tab by mouth daily.  ASCORBATE CALCIUM (VITAMIN C PO) Take  by mouth. Past History     Past Medical History:  Past Medical History:   Diagnosis Date    Fibromyalgia     Lupus     Ovarian cyst        Past Surgical History:  Past Surgical History:   Procedure Laterality Date    HX APPENDECTOMY  2005    HX BLADDER SUSPENSION  2011    HX BREAST AUGMENTATION  2009    HX BREAST BIOPSY Bilateral 2006    neg    HX CYST INCISION AND DRAINAGE Bilateral 1996    neg    HX OVARIAN CYST REMOVAL  1990    HX TONSILLECTOMY      IMPLANT BREAST SILICONE/EQ Bilateral 5604    US GUIDED CORE BREAST BIOPSY Left 2015    neg    US GUIDED CORE BREAST BIOPSY Left 2012    neg       Family History:  Family History   Problem Relation Age of Onset    Parkinson's Disease Mother     Other Mother         fibromyalgia    Dementia Mother     Neuropathy Mother    [facility] Stroke Mother     Migraines Sister     Heart Disease Brother     Stroke Brother     Cancer Maternal Grandfather         lung    Heart Disease Maternal Grandfather     Stroke Maternal Grandfather     Cancer Paternal Grandmother         testicular    Cancer Paternal Grandfather         ovarian       Social History:  Social History     Tobacco Use    Smoking status: Never Smoker    Smokeless tobacco: Never Used   Substance Use Topics    Alcohol use: Yes     Alcohol/week: 2.4 oz     Types: 4 Standard drinks or equivalent per week    Drug use: Not on file       Allergies:   Allergies   Allergen Reactions    Latex, Natural Rubber Contact Dermatitis    Adhesive Rash    Morphine Rash    Mushroom Rash and Other (comments)     Rash and sore throat    Peach Flavor Rash and Other (comments)     Rash and sore throat    Sulfa (Sulfonamide Antibiotics) Unknown (comments)     Pt has lupus    Tessalon [Benzonatate] Rash    Tetracycline Unknown (comments)     Pt has lupus         Review of Systems   Review of Systems   Constitutional: Negative for chills and fever. HENT: Negative for ear pain and sore throat. Eyes: Negative for redness and visual disturbance. Respiratory: Negative for cough and shortness of breath. Cardiovascular: Negative for chest pain and palpitations. Gastrointestinal: Positive for abdominal pain, nausea and vomiting. Negative for constipation and diarrhea. Genitourinary: Negative for dysuria and hematuria. Musculoskeletal: Negative for back pain and gait problem. Skin: Negative for rash and wound. Neurological: Negative for dizziness and headaches. Psychiatric/Behavioral: Negative for behavioral problems and confusion. All other systems reviewed and are negative. Physical Exam   Physical Exam   Constitutional: She is oriented to person, place, and time. She appears well-developed and well-nourished. Very uncomfortable appearing, sitting upright and clutching her abdomen. HENT:   Head: Normocephalic and atraumatic. Eyes: Conjunctivae and EOM are normal. Pupils are equal, round, and reactive to light. Neck: Normal range of motion. Neck supple. Cardiovascular: Normal rate, regular rhythm and normal heart sounds. Pulmonary/Chest: Effort normal and breath sounds normal.   Abdominal: She exhibits no distension. There is tenderness in the right upper quadrant. There is guarding. There is no CVA tenderness. Musculoskeletal: Normal range of motion. Neurological: She is alert and oriented to person, place, and time. She has normal strength. No cranial nerve deficit or sensory deficit. GCS eye subscore is 4. GCS verbal subscore is 5. GCS motor subscore is 6. Skin: Skin is warm and dry. No rash noted. Psychiatric: She has a normal mood and affect. Her behavior is normal.   Nursing note and vitals reviewed. Diagnostic Study Results     Labs -     Recent Results (from the past 12 hour(s))   CBC WITH AUTOMATED DIFF    Collection Time: 03/15/19  3:37 PM   Result Value Ref Range    WBC 12.8 (H) 3.6 - 11.0 K/uL    RBC 4.85 3.80 - 5.20 M/uL    HGB 13.9 11.5 - 16.0 g/dL    HCT 42.6 35.0 - 47.0 %    MCV 87.8 80.0 - 99.0 FL    MCH 28.7 26.0 - 34.0 PG    MCHC 32.6 30.0 - 36.5 g/dL    RDW 12.6 11.5 - 14.5 %    PLATELET 964 250 - 270 K/uL    MPV 9.7 8.9 - 12.9 FL    NRBC 0.2 (H) 0  WBC    ABSOLUTE NRBC 0.02 (H) 0.00 - 0.01 K/uL    NEUTROPHILS 88 (H) 32 - 75 %    LYMPHOCYTES 6 (L) 12 - 49 %    MONOCYTES 4 (L) 5 - 13 %    EOSINOPHILS 0 0 - 7 %    BASOPHILS 1 0 - 1 %    IMMATURE GRANULOCYTES 1 (H) 0.0 - 0.5 %    ABS. NEUTROPHILS 11.3 (H) 1.8 - 8.0 K/UL    ABS. LYMPHOCYTES 0.8 0.8 - 3.5 K/UL    ABS. MONOCYTES 0.5 0.0 - 1.0 K/UL    ABS. EOSINOPHILS 0.0 0.0 - 0.4 K/UL    ABS. BASOPHILS 0.1 0.0 - 0.1 K/UL    ABS. IMM. GRANS. 0.1 (H) 0.00 - 0.04 K/UL    DF AUTOMATED      RBC COMMENTS NORMOCYTIC, NORMOCHROMIC     METABOLIC PANEL, COMPREHENSIVE    Collection Time: 03/15/19  3:37 PM   Result Value Ref Range    Sodium 138 136 - 145 mmol/L    Potassium 3.5 3.5 - 5.1 mmol/L    Chloride 106 97 - 108 mmol/L    CO2 25 21 - 32 mmol/L    Anion gap 7 5 - 15 mmol/L    Glucose 100 65 - 100 mg/dL    BUN 12 6 - 20 MG/DL    Creatinine 0.76 0.55 - 1.02 MG/DL    BUN/Creatinine ratio 16 12 - 20      GFR est AA >60 >60 ml/min/1.73m2    GFR est non-AA >60 >60 ml/min/1.73m2    Calcium 8.8 8.5 - 10.1 MG/DL    Bilirubin, total 0.4 0.2 - 1.0 MG/DL    ALT (SGPT) 30 12 - 78 U/L    AST (SGOT) 20 15 - 37 U/L    Alk.  phosphatase 59 45 - 117 U/L    Protein, total 7.4 6.4 - 8.2 g/dL    Albumin 4.0 3.5 - 5.0 g/dL    Globulin 3.4 2.0 - 4.0 g/dL    A-G Ratio 1. 2 1.1 - 2.2     LIPASE    Collection Time: 03/15/19  3:37 PM   Result Value Ref Range    Lipase 111 73 - 393 U/L   URINALYSIS W/MICROSCOPIC    Collection Time: 03/15/19  4:25 PM   Result Value Ref Range    Color DARK YELLOW      Appearance CLEAR CLEAR      Specific gravity 1.029 1.003 - 1.030      pH (UA) 6.5 5.0 - 8.0      Protein NEGATIVE  NEG mg/dL    Glucose NEGATIVE  NEG mg/dL    Ketone TRACE (A) NEG mg/dL    Blood NEGATIVE  NEG      Urobilinogen 1.0 0.2 - 1.0 EU/dL    Nitrites NEGATIVE  NEG      Leukocyte Esterase TRACE (A) NEG      WBC 5-10 0 - 4 /hpf    RBC 5-10 0 - 5 /hpf    Epithelial cells FEW FEW /lpf    Bacteria NEGATIVE  NEG /hpf    Mucus 1+ (A) NEG /lpf   URINE CULTURE HOLD SAMPLE    Collection Time: 03/15/19  4:25 PM   Result Value Ref Range    Urine culture hold        URINE ON HOLD IN MICROBIOLOGY DEPT FOR 3 DAYS. IF UNPRESERVED URINE IS SUBMITTED, IT CANNOT BE USED FOR ADDITIONAL TESTING AFTER 24 HRS, RECOLLECTION WILL BE REQUIRED. BILIRUBIN, CONFIRM    Collection Time: 03/15/19  4:25 PM   Result Value Ref Range    Bilirubin UA, confirm NEGATIVE  NEG         Radiologic Studies -   CT ABD PELV W CONT   Final Result   IMPRESSION:    Moderate to large amount of colonic stool in the right hemicolon. No bowel   obstruction or other acute abnormality in the abdomen or pelvis. US ABD LTD   Final Result   IMPRESSION:      Normal right upper quadrant limited abdominal ultrasound. CT Results  (Last 48 hours)               03/15/19 2202  CT ABD PELV W CONT Final result    Impression:  IMPRESSION:    Moderate to large amount of colonic stool in the right hemicolon. No bowel   obstruction or other acute abnormality in the abdomen or pelvis. Narrative:  EXAM:  CT ABD PELV W CONT       INDICATION: Severe right upper abdomen pain. COMPARISON: Ultrasound 3/15/2019.        TECHNIQUE: Helical CT of the abdomen  and pelvis  following the uneventful   intravenous administration of nonionic contrast.  Coronal and sagittal reformats   are performed. CT dose reduction was achieved through use of a standardized   protocol tailored for this examination and automatic exposure control for dose   modulation. FINDINGS:    The visualized lung bases demonstrate no mass or consolidation. There is   bilateral dependent atelectasis. The heart size is normal. There is no   pericardial or pleural effusion. Bilateral breast prostheses are noted. The liver, spleen, pancreas, and adrenal glands are normal. The gall bladder is   present  without intra- or extra-hepatic biliary dilatation. The kidneys are symmetric without hydronephrosis. There is a moderate to large amount of stool in the right hemicolon. There are   no dilated bowel loops. The appendix is surgically absent. There is mild distal   colonic diverticulosis without focal adjacent inflammation. There are no enlarged lymph nodes. There is no free fluid or free air. The   aorta tapers without aneurysm. The urinary bladder is normal.  There is no pelvic mass. The bony structures are age-appropriate. CXR Results  (Last 48 hours)    None            Medical Decision Making   I am the first provider for this patient. I reviewed the vital signs, available nursing notes, past medical history, past surgical history, family history and social history. Vital Signs-Reviewed the patient's vital signs.   Patient Vitals for the past 12 hrs:   Temp Pulse Resp BP SpO2   03/15/19 1916 98.6 °F (37 °C) 65 18 128/58 97 %   03/15/19 1830    135/68 94 %   03/15/19 1800    147/86 97 %   03/15/19 1750    138/75    03/15/19 1525 97.9 °F (36.6 °C) 65 16 125/72 100 %         Records Reviewed: Nursing Notes and Old Medical Records    Provider Notes (Medical Decision Making):   Differential diagnosis includes acute cholecystitis, cholelithiasis, pyelonephritis, kidney stone, gastroenteritis, biliary dyskinesia    ED Course:   Initial assessment performed. The patients presenting problems have been discussed, and they are in agreement with the care plan formulated and outlined with them. I have encouraged them to ask questions as they arise throughout their visit. 10:30 PM - Patient was given multiple doses of analgesia and her pain had significantly improved by the end of her stay. I discussed test and imaging results with her. CT shows moderate to large amount of colonic stool in the right hemicolon, which certainly can explain her symptoms. I discussed that there is still a possibility of biliary dyskinesia but that constipation is more likely causing her pain today. Will prescribe Bentyl and stool softener. She is advised to follow-up with her PCP in 2 days for a recheck and to assess if she will need further workup with GI. We discussed return precautions. Critical Care Time:   none    DISCHARGE NOTE:  10:40 PM -    Flavia Morris's  results have been reviewed with her. She has been counseled regarding her diagnosis. She verbally conveys understanding and agreement of the signs, symptoms, diagnosis, treatment and prognosis and additionally agrees to follow up as recommended with Dr. Yamil Guevara MD in 24 - 48 hours. She also agrees with the care-plan and conveys that all of her questions have been answered. I have also put together some discharge instructions for her that include: 1) educational information regarding their diagnosis, 2) how to care for their diagnosis at home, as well a 3) list of reasons why they would want to return to the ED prior to their follow-up appointment, should their condition change. She and/or family's questions have been answered. I have encouraged them to see the official results in [facility]\" or to retrieve the specifics of their results from medical records. PLAN:  1. Return precautions as discussed  2.  Follow-up with providers as directed  3. Medications as prescribed    Return to ED if worse     Diagnosis     Clinical Impression:   1. Abdominal pain, right upper quadrant    2. Constipation, unspecified constipation type        Discharge Medication List as of 3/15/2019 10:40 PM      START taking these medications    Details   dicyclomine (BENTYL) 20 mg tablet Take 1 Tab by mouth every six (6) hours for 20 doses. , Normal, Disp-20 Tab, R-0      oxyCODONE-acetaminophen (PERCOCET) 5-325 mg per tablet Take 1 Tab by mouth every six (6) hours as needed for Pain for up to 3 days. Max Daily Amount: 4 Tabs., Print, Disp-12 Tab, R-0      promethazine (PHENERGAN) 25 mg tablet Take 1 Tab by mouth every six (6) hours as needed for Nausea., Normal, Disp-12 Tab, R-0      docusate sodium (COLACE) 100 mg capsule Take 1 Cap by mouth two (2) times a day for 14 days. , Normal, Disp-28 Cap, R-0         CONTINUE these medications which have NOT CHANGED    Details   ondansetron (ZOFRAN ODT) 4 mg disintegrating tablet Take 1 Tab by mouth now for 1 dose., Sample, Disp-1 Tab, R-0      cholecalciferol, vitamin D3, (VITAMIN D3 PO) Take  by mouth., Historical Med      fexofenadine (ALLEGRA) 180 mg tablet Take  by mouth., Historical Med      glucosamine sulfate (GLUCOSAMINE PO) Take  by mouth., Historical Med      QUERCETIN DIHYDRATE, BULK, by Does Not Apply route., Historical Med      !! ascorbic acid (VITAMIN C) 250 mg tablet Take  by mouth., Historical Med      Cetirizine (ZYRTEC) 10 mg cap Take  by mouth., Historical Med      b complex vitamins (B COMPLEX 1) tablet Take 1 Tab by mouth daily. , Historical Med      !! ASCORBATE CALCIUM (VITAMIN C PO) Take  by mouth., Historical Med       !! - Potential duplicate medications found. Please discuss with provider.           Follow-up Information     Follow up With Specialties Details Why Contact Info    Renan Anton MD Family Practice Go in 2 days for a recheck [address] [id number] [facility] Emergency Medicine Go to If symptoms worsen [address] [facility] [address]

## 2019-03-16 NOTE — DISCHARGE INSTRUCTIONS
Patient Education        Abdominal Pain: Care Instructions  Your Care Instructions    Abdominal pain has many possible causes. Some aren't serious and get better on their own in a few days. Others need more testing and treatment. If your pain continues or gets worse, you need to be rechecked and may need more tests to find out what is wrong. You may need surgery to correct the problem. Don't ignore new symptoms, such as fever, nausea and vomiting, urination problems, pain that gets worse, and dizziness. These may be signs of a more serious problem. Your doctor may have recommended a follow-up visit in the next 8 to 12 hours. If you are not getting better, you may need more tests or treatment. The doctor has checked you carefully, but problems can develop later. If you notice any problems or new symptoms, get medical treatment right away. Follow-up care is a key part of your treatment and safety. Be sure to make and go to all appointments, and call your doctor if you are having problems. It's also a good idea to know your test results and keep a list of the medicines you take. How can you care for yourself at home? · Rest until you feel better. · To prevent dehydration, drink plenty of fluids, enough so that your urine is light yellow or clear like water. Choose water and other caffeine-free clear liquids until you feel better. If you have kidney, heart, or liver disease and have to limit fluids, talk with your doctor before you increase the amount of fluids you drink. · If your stomach is upset, eat mild foods, such as rice, dry toast or crackers, bananas, and applesauce. Try eating several small meals instead of two or three large ones. · Wait until 48 hours after all symptoms have gone away before you have spicy foods, alcohol, and drinks that contain caffeine. · Do not eat foods that are high in fat. · Avoid anti-inflammatory medicines such as aspirin, ibuprofen (Advil, Motrin), and naproxen (Aleve). These can cause stomach upset. Talk to your doctor if you take daily aspirin for another health problem. When should you call for help? Call 911 anytime you think you may need emergency care. For example, call if:    · You passed out (lost consciousness).     · You pass maroon or very bloody stools.     · You vomit blood or what looks like coffee grounds.     · You have new, severe belly pain.    Call your doctor now or seek immediate medical care if:    · Your pain gets worse, especially if it becomes focused in one area of your belly.     · You have a new or higher fever.     · Your stools are black and look like tar, or they have streaks of blood.     · You have unexpected vaginal bleeding.     · You have symptoms of a urinary tract infection. These may include:  ? Pain when you urinate. ? Urinating more often than usual.  ? Blood in your urine.     · You are dizzy or lightheaded, or you feel like you may faint.    Watch closely for changes in your health, and be sure to contact your doctor if:    · You are not getting better after 1 day (24 hours). Where can you learn more? Go to http://emilieBagel Nashgabriella.info/. Enter F341 in the search box to learn more about \"Abdominal Pain: Care Instructions. \"  Current as of: September 23, 2018  Content Version: 11.9  © 7784-8108 [employer], [employer]. Care instructions adapted under license by [employer] (which disclaims liability or warranty for this information). If you have questions about a medical condition or this instruction, always ask your healthcare professional. [address] any warranty or liability for your use of this information. Patient Education        Constipation: Care Instructions  Your Care Instructions    Constipation means that you have a hard time passing stools (bowel movements). People pass stools from 3 times a day to once every 3 days. What is normal for you may be different. Constipation may occur with pain in the rectum and cramping. The pain may get worse when you try to pass stools. Sometimes there are small amounts of bright red blood on toilet paper or the surface of stools. This is because of enlarged veins near the rectum (hemorrhoids). A few changes in your diet and lifestyle may help you avoid ongoing constipation. Your doctor may also prescribe medicine to help loosen your stool. Some medicines can cause constipation. These include pain medicines and antidepressants. Tell your doctor about all the medicines you take. Your doctor may want to make a medicine change to ease your symptoms. Follow-up care is a key part of your treatment and safety. Be sure to make and go to all appointments, and call your doctor if you are having problems. It's also a good idea to know your test results and keep a list of the medicines you take. How can you care for yourself at home? · Drink plenty of fluids, enough so that your urine is light yellow or clear like water. If you have kidney, heart, or liver disease and have to limit fluids, talk with your doctor before you increase the amount of fluids you drink. · Include high-fiber foods in your diet each day. These include fruits, vegetables, beans, and whole grains. · Get at least 30 minutes of exercise on most days of the week. Walking is a good choice. You also may want to do other activities, such as running, swimming, cycling, or playing tennis or team sports. · Take a fiber supplement, such as Citrucel or Metamucil, every day. Read and follow all instructions on the label. · Schedule time each day for a bowel movement. A daily routine may help. Take your time having your bowel movement. · Support your feet with a small step stool when you sit on the toilet. This helps flex your hips and places your pelvis in a squatting position. · Your doctor may recommend an over-the-counter laxative to relieve your constipation.  Examples are Milk of Magnesia and MiraLax. Read and follow all instructions on the label. Do not use laxatives on a long-term basis. When should you call for help? Call your doctor now or seek immediate medical care if:    · You have new or worse belly pain.     · You have new or worse nausea or vomiting.     · You have blood in your stools.    Watch closely for changes in your health, and be sure to contact your doctor if:    · Your constipation is getting worse.     · You do not get better as expected. Where can you learn more? Go to http://emilie-gabriella.info/. Enter 21  in the search box to learn more about \"Constipation: Care Instructions. \"  Current as of: September 23, 2018  Content Version: 11.9  © 0424-4916 [employer]. Care instructions adapted under license by [employer] (which disclaims liability or warranty for this information). If you have questions about a medical condition or this instruction, always ask your healthcare professional. [address] any warranty or liability for your use of this information.

## 2019-03-27 ENCOUNTER — HOSPITAL ENCOUNTER (OUTPATIENT)
Dept: NUCLEAR MEDICINE | Age: 49
Discharge: HOME | End: 2019-03-27
Attending: NURSE PRACTITIONER
Payer: COMMERCIAL

## 2019-03-27 VITALS — WEIGHT: 156 LBS | BODY MASS INDEX: 23.72 KG/M2

## 2019-03-27 DIAGNOSIS — R10.10 PAIN OF UPPER ABDOMEN: ICD-10-CM

## 2019-03-27 PROCEDURE — 74011250636 HC RX REV CODE- 250/636

## 2019-03-27 PROCEDURE — 74011000258 HC RX REV CODE- 258

## 2019-03-27 PROCEDURE — 78227 HEPATOBIL SYST IMAGE W/DRUG: CPT

## 2019-03-27 RX ORDER — SODIUM CHLORIDE 0.9 % (FLUSH) 0.9 %
10 SYRINGE (ML) INJECTION
Status: COMPLETED | OUTPATIENT
Start: 2019-03-27 | End: 2019-03-27

## 2019-03-27 RX ORDER — SODIUM CHLORIDE 9 MG/ML
25 INJECTION, SOLUTION INTRAVENOUS
Status: COMPLETED | OUTPATIENT
Start: 2019-03-27 | End: 2019-03-27

## 2019-03-27 RX ADMIN — Medication 10 ML: at 12:53

## 2019-03-27 RX ADMIN — SINCALIDE 1.42 MCG: 5 INJECTION, POWDER, LYOPHILIZED, FOR SOLUTION INTRAVENOUS at 14:00

## 2019-03-27 RX ADMIN — SODIUM CHLORIDE 25 ML: 900 INJECTION, SOLUTION INTRAVENOUS at 14:00

## 2019-09-06 ENCOUNTER — TELEPHONE (OUTPATIENT)
Dept: INTERNAL MEDICINE CLINIC | Age: 49
End: 2019-09-06

## 2019-09-06 NOTE — TELEPHONE ENCOUNTER
Currently working to obtain approval for a refund of $124.28 for the patient due to a visit issue at [facility] with Dr Alyce Rangel on March 15th. Approval for refund pending.

## 2019-12-20 ENCOUNTER — HOSPITAL ENCOUNTER (OUTPATIENT)
Dept: CT IMAGING | Age: 49
Discharge: HOME | End: 2019-12-20
Attending: OTOLARYNGOLOGY
Payer: COMMERCIAL

## 2019-12-20 DIAGNOSIS — Z00.8 OTHER SPECIFIED GENERAL MEDICAL EXAMINATION: ICD-10-CM

## 2019-12-20 DIAGNOSIS — Z78.9 NON-SMOKER: ICD-10-CM

## 2019-12-20 DIAGNOSIS — J45.909 ASTHMA: ICD-10-CM

## 2019-12-20 DIAGNOSIS — R06.1 STRIDOR: ICD-10-CM

## 2019-12-20 PROCEDURE — 74011636320 HC RX REV CODE- 636/320: Performed by: OTOLARYNGOLOGY

## 2019-12-20 PROCEDURE — 70491 CT SOFT TISSUE NECK W/DYE: CPT

## 2019-12-20 RX ORDER — SODIUM CHLORIDE 0.9 % (FLUSH) 0.9 %
10 SYRINGE (ML) INJECTION
Status: COMPLETED | OUTPATIENT
Start: 2019-12-20 | End: 2019-12-20

## 2019-12-20 RX ADMIN — IOPAMIDOL 100 ML: 755 INJECTION, SOLUTION INTRAVENOUS at 10:06

## 2019-12-20 RX ADMIN — Medication 10 ML: at 10:06

## 2020-01-17 ENCOUNTER — OFFICE VISIT (OUTPATIENT)
Dept: SLEEP MEDICINE | Age: 50
End: 2020-01-17

## 2020-01-17 VITALS
DIASTOLIC BLOOD PRESSURE: 55 MMHG | SYSTOLIC BLOOD PRESSURE: 105 MMHG | WEIGHT: 162 LBS | HEIGHT: 68 IN | HEART RATE: 57 BPM | BODY MASS INDEX: 24.55 KG/M2 | TEMPERATURE: 98.8 F | OXYGEN SATURATION: 100 %

## 2020-01-17 DIAGNOSIS — G47.419 NARCOLEPSY WITHOUT CATAPLEXY: Primary | ICD-10-CM

## 2020-01-17 DIAGNOSIS — Z86.59 H/O: DEPRESSION: ICD-10-CM

## 2020-01-17 DIAGNOSIS — G47.50 PARASOMNIA, UNSPECIFIED TYPE: ICD-10-CM

## 2020-01-17 DIAGNOSIS — M79.7 FIBROMYALGIA: ICD-10-CM

## 2020-01-17 DIAGNOSIS — G47.8 SLEEP PARALYSIS: ICD-10-CM

## 2020-01-17 RX ORDER — ETODOLAC 200 MG/1
CAPSULE ORAL
COMMUNITY

## 2020-01-17 NOTE — PATIENT INSTRUCTIONS
217 [facility] [place]. [place], [address]  Tel.  [phone]  Fax. 100 [facility] 60  [place], [address] [facility]  Tel.  [phone]  Fax. [phone] [address]  Tel.  [phone]  Fax. [phone]     Sleep Apnea: After Your Visit  Your Care Instructions  Sleep apnea occurs when you frequently stop breathing for 10 seconds or longer during sleep. It can be mild to severe, based on the number of times per hour that you stop breathing or have slowed breathing. Blocked or narrowed airways in your nose, mouth, or throat can cause sleep apnea. Your airway can become blocked when your throat muscles and tongue relax during sleep. Sleep apnea is common, occurring in 1 out of 20 individuals. Individuals having any of the following characteristics should be evaluated and treated right away due to high risk and detrimental consequences from untreated sleep apnea:  1. Obesity  2. Congestive Heart failure  3. Atrial Fibrillation  4. Uncontrolled Hypertension  5. Type II Diabetes  6. Night-time Arrhythmias  7. Stroke  8. Pulmonary Hypertension  9. High-risk Driving Populations (pilots, truck drivers, etc.)  10. Patients Considering Weight-loss Surgery    How do you know you have sleep apnea? You probably have sleep apnea if you answer 'yes' to 3 or more of the following questions:  S - Have you been told that you Snore? T - Are you often Tired during the day? O - Has anyone Observed you stop breathing while sleeping? P- Do you have (or are being treated for) high blood Pressure? B - Are you obese (Body Mass Index > 35)? A - Is your Age 48years old or older? N - Is your Neck size greater than 16 inches? G - Are you male Gender? A sleep physician can prescribe a breathing device that prevents tissues in the throat from blocking your airway.  Or your doctor may recommend using a dental device (oral breathing device) to help keep your airway open. In some cases, surgery may be needed to remove enlarged tissues in the throat. Follow-up care is a key part of your treatment and safety. Be sure to make and go to all appointments, and call your doctor if you are having problems. It's also a good idea to know your test results and keep a list of the medicines you take. How can you care for yourself at home? · Lose weight, if needed. It may reduce the number of times you stop breathing or have slowed breathing. · Go to bed at the same time every night. · Sleep on your side. It may stop mild apnea. If you tend to roll onto your back, sew a pocket in the back of your pajama top. Put a tennis ball into the pocket, and stitch the pocket shut. This will help keep you from sleeping on your back. · Avoid alcohol and medicines such as sleeping pills and sedatives before bed. · Do not smoke. Smoking can make sleep apnea worse. If you need help quitting, talk to your doctor about stop-smoking programs and medicines. These can increase your chances of quitting for good. · Prop up the head of your bed 4 to 6 inches by putting bricks under the legs of the bed. · Treat breathing problems, such as a stuffy nose, caused by a cold or allergies. · Use a continuous positive airway pressure (CPAP) breathing machine if lifestyle changes do not help your apnea and your doctor recommends it. The machine keeps your airway from closing when you sleep. · If CPAP does not help you, ask your doctor whether you should try other breathing machines. A bilevel positive airway pressure machine has two types of air pressureâone for breathing in and one for breathing out. Another device raises or lowers air pressure as needed while you breathe. · If your nose feels dry or bleeds when using one of these machines, talk with your doctor about increasing moisture in the air. A humidifier may help.   · If your nose is runny or stuffy from using a breathing machine, talk with your doctor about using decongestants or a corticosteroid nasal spray. When should you call for help? Watch closely for changes in your health, and be sure to contact your doctor if:  · You still have sleep apnea even though you have made lifestyle changes. · You are thinking of trying a device such as CPAP. · You are having problems using a CPAP or similar machine. Where can you learn more? Go to [employer]. Enter P111 in the search box to learn more about \"Sleep Apnea: After Your Visit. \"   © 3324-7895 Healthwise, Incorporated. Care instructions adapted under license by [facility] [employer] (which disclaims liability or warranty for this information). This care instruction is for use with your licensed healthcare professional. If you have questions about a medical condition or this instruction, always ask your healthcare professional. Cloyce Pellet any warranty or liability for your use of this information. PROPER SLEEP HYGIENE    What to avoid  · Do not have drinks with caffeine, such as coffee or black tea, for 8 hours before bed. · Do not smoke or use other types of tobacco near bedtime. Nicotine is a stimulant and can keep you awake. · Avoid drinking alcohol late in the evening, because it can cause you to wake in the middle of the night. · Do not eat a big meal close to bedtime. If you are hungry, eat a light snack. · Do not drink a lot of water close to bedtime, because the need to urinate may wake you up during the night. · Do not read or watch TV in bed. Use the bed only for sleeping and sexual activity. What to try  · Go to bed at the same time every night, and wake up at the same time every morning. Do not take naps during the day. · Keep your bedroom quiet, dark, and cool. · Get regular exercise, but not within 3 to 4 hours of your bedtime. .  · Sleep on a comfortable pillow and mattress.   · If watching the clock makes you anxious, turn it facing away from you so you cannot see the time. · If you worry when you lie down, start a worry book. Well before bedtime, write down your worries, and then set the book and your concerns aside. · Try meditation or other relaxation techniques before you go to bed. · If you cannot fall asleep, get up and go to another room until you feel sleepy. Do something relaxing. Repeat your bedtime routine before you go to bed again. · Make your house quiet and calm about an hour before bedtime. Turn down the lights, turn off the TV, log off the computer, and turn down the volume on music. This can help you relax after a busy day. Drowsy Driving  The [address] [place] [address] Road Traffic Safety Administration cites drowsiness as a causing factor in more than 548,063 police reported crashes annually, resulting in 76,000 injuries and 1,500 deaths. Other surveys suggest 55% of people polled have driven while drowsy in the past year, 23% had fallen asleep but not crashed, 3% crashed, and 2% had and accident due to drowsy driving. Who is at risk? Young Drivers: One study of drowsy driving accidents states that 55% of the drivers were under 25 years. Of those, 75% were male. Shift Workers and Travelers: People who work overnight or travel across time zones frequently are at higher risk of experiencing Circadian Rhythm Disorders. They are trying to work and function when their body is programed to sleep. Sleep Deprived: Lack of sleep has a serious impact on your ability to pay attention or focus on a task. Consistently getting less than the average of 8 hours your body needs creates partial or cumulative sleep deprivation. Untreated Sleep Disorders: Sleep Apnea, Narcolepsy, R.L.S., and other sleep disorders (untreated) prevent a person from getting enough restful sleep. This leads to excessive daytime sleepiness and increases the risk for drowsy driving accidents by up to 7 times.   Medications / Alcohol: Even over the counter medications can cause drowsiness. Medications that impair a drivers attention should have a warning label. Alcohol naturally makes you sleepy and on its own can cause accidents. Combined with excessive drowsiness its effects are amplified. Signs of Drowsy Driving:   * You don't remember driving the last few miles   * You may drift out of your jermaine   * You are unable to focus and your thoughts wander   * You may yawn more often than normal   * You have difficulty keeping your eyes open / nodding off   * Missing traffic signs, speeding, or tailgating  Prevention-   Good sleep hygiene, lifestyle and behavioral choices have the most impact on drowsy driving. There is no substitute for sleep and the average person requires 8 hours nightly. If you find yourself driving drowsy, stop and sleep. Consider the sleep hygiene tips provided during your visit as well. Medication Refill Policy: Refills for all medications require 1 week advance notice. Please have your pharmacy fax a refill request. We are unable to fax, or call in \"controled substance\" medications and you will need to pick these prescriptions up from our office. [employer] Activation    Thank you for requesting access to [employer]. Please follow the instructions below to securely access and download your online medical record. [employer] allows you to send messages to your doctor, view your test results, renew your prescriptions, schedule appointments, and more. How Do I Sign Up? 1. In your internet browser, go to [email or web address]. 2. Click on the First Time User? Click Here link in the Sign In box. You will see the New Member Sign Up page. 3. Enter your [employer] Access Code exactly as it appears below. You will not need to use this code after youve completed the sign-up process. If you do not sign up before the expiration date, you must request a new code.     [employer] Access Code: [email or web address]  Expires: 2/20/2020 12:58 PM (This is the date your [employer] access code will )    4. Enter the last four digits of your Social Security Number (xxxx) and Date of Birth (mm/dd/yyyy) as indicated and click Submit. You will be taken to the next sign-up page. 5. Create a SoNet[employer]t ID. This will be your [employer] login ID and cannot be changed, so think of one that is secure and easy to remember. 6. Create a [employer] password. You can change your password at any time. 7. Enter your Password Reset Question and Answer. This can be used at a later time if you forget your password. 8. Enter your e-mail address. You will receive e-mail notification when new information is available in [address] 9. Click Sign Up. You can now view and download portions of your medical record. 10. Click the Download Summary menu link to download a portable copy of your medical information. Additional Information    If you have questions, please call [phone]. Remember, [employer] is NOT to be used for urgent needs. For medical emergencies, dial 911.

## 2020-01-17 NOTE — PROGRESS NOTES
217 [facility] [place]. [place], [address]  Tel.  [phone]  Fax. 100 [facility] 60  [place], [address] [facility]  Tel.  [phone]  Fax. [phone] [address]  Tel.  [phone]  Fax. [phone]         Subjective:      Rufus Rivera is an 44 y.o. female referred for evaluation for a sleep disorder. She complains of noisy breathing (evaluated about 1 month previously by by Dr. 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 Symptoms began 3 years ago, gradually worsening since that time. She usually can fall asleep in 5 minutes. Family or house members do not note snoring. She reports of feeling completely or partially paralyzed while falling asleep or waking up. She denies of symptoms indicative of cataplexy or hypnagogic hallucinations. She report of waking up with cuts on her right arm about 2 weeks previously - attributes this to possible sleep walking. Rufus Rivera does wake up frequently at night. She is bothered by waking up too early and left unable to get back to sleep. She actually sleeps about 8 hours at night and wakes up about 3 times during the night. She does not work shifts:  .   Flavia indicates she does get too little sleep at night. Her bedtime is 2200. She awakens at 0700. She does take naps. She takes 2 naps a week lasting 15 to 30 minutes. She has the following observed behaviors: Loud snoring, Light snoring, Sleep talking, Getting out of the bed while still asleep, Sleepwalking (as a child and during periods of stress), Grinding teeth;  . Other remarks: waking with a gasp or snort    [place] Sleepiness Score: 12 which reflect moderate daytime drowsiness.     Allergies   Allergen Reactions    Latex, Natural Rubber Contact Dermatitis  Adhesive Rash    Morphine Rash    Mushroom Rash and Other (comments)     Rash and sore throat    Peach Flavor Rash and Other (comments)     Rash and sore throat    Sulfa (Sulfonamide Antibiotics) Unknown (comments)     Pt has lupus    Tessalon [Benzonatate] Rash    Tetracycline Unknown (comments)     Pt has lupus         Current Outpatient Medications:     etodolac (LODINE) 200 mg capsule, etodolac 200 mg capsule  Take 1 capsule 3 times a day by oral route., Disp: , Rfl:     cholecalciferol, vitamin D3, (VITAMIN D3 PO), Take  by mouth., Disp: , Rfl:     fexofenadine (ALLEGRA) 180 mg tablet, Take  by mouth., Disp: , Rfl:     QUERCETIN DIHYDRATE, BULK,, by Does Not Apply route., Disp: , Rfl:     ascorbic acid (VITAMIN C) 250 mg tablet, Take  by mouth., Disp: , Rfl:     b complex vitamins (B COMPLEX 1) tablet, Take 1 Tab by mouth daily. , Disp: , Rfl:     promethazine (PHENERGAN) 25 mg tablet, Take 1 Tab by mouth every six (6) hours as needed for Nausea., Disp: 12 Tab, Rfl: 0    glucosamine sulfate (GLUCOSAMINE PO), Take  by mouth., Disp: , Rfl:     Cetirizine (ZYRTEC) 10 mg cap, Take  by mouth., Disp: , Rfl:     ASCORBATE CALCIUM (VITAMIN C PO), Take  by mouth., Disp: , Rfl:      She  has a past medical history of Fibromyalgia, Lupus ([facility] [address] Ovarian cyst, and Psychiatric disorder. She  has a past surgical history that includes hx tonsillectomy; hx appendectomy (2005); hx ovarian cyst removal (1990); hx breast augmentation (2009); hx bladder suspension (2011); hx cyst incision and drainage (Bilateral, 1996); implant breast silicone/eq (Bilateral, 2008); us guided core breast biopsy (Left, 2015); us guided core breast biopsy (Left, 2012); and hx breast biopsy (Bilateral, 2006).     She family history includes Cancer in her maternal grandfather, paternal grandfather, and paternal grandmother; Dementia in her mother; Heart Disease in her brother and maternal grandfather; Migraines in her sister; Neuropathy in her mother; Other in her mother; Parkinson's Disease in her mother; Stroke in her brother, maternal grandfather, and mother. She  reports that she has never smoked. She has never used smokeless tobacco. She reports current alcohol use of about 4.0 standard drinks of alcohol per week. Review of Systems:  Constitutional:  No significant weight loss or weight gain  Eyes:  No blurred vision  CVS:  No significant chest pain  Pulm:  No significant shortness of breath  GI:  No significant nausea or vomiting  :  No significant nocturia  Musculoskeletal:  No significant joint pain at night  Skin:  No significant rashes  Neuro:  No significant dizziness   Psych:  No active mood issues    Sleep Review of Systems: notable for no difficulty falling asleep; infrequent awakenings at night;  regular dreaming noted; no nightmares ; no early morning headaches; no memory problems; no concentration issues; no history of any automobile or occupational accidents due to daytime drowsiness. Objective:     Visit Vitals  /55 (BP 1 Location: Left arm, BP Patient Position: Sitting)   Pulse (!) 57   Temp 98.8 °F (37.1 °C) (Oral)   Ht 5' 8\" (1.727 m)   Wt 162 lb (73.5 kg)   SpO2 100%   BMI 24.63 kg/m²         General:   Not in acute distress   Eyes:  Anicteric sclerae, no obvious strabismus   Nose:  No obvious nasal septum deviation    Oropharynx:   Class 3 oropharyngeal outlet, thick tongue base, uvula could not be seen due to low-lying soft palate, narrow tonsilo-pharyngeal pilars   Tonsils:   tonsils are not seen due to low-lying soft palate   Neck:   Neck circ.  in \"inches\": 14.25; midline trachea   Chest/Lungs:  Equal lung expansion, clear on auscultation    CVS:  Normal rate, regular rhythm; no JVD   Skin:  Warm to touch; no obvious rashes   Neuro:  No focal deficits ; no obvious tremor    Psych:  Normal affect,  normal countenance;          Assessment:       ICD-10-CM ICD-9-CM    1. Narcolepsy without cataplexy G47.419 347.00 7-DRUG SCREEN, UR      POLYSOMNOGRAPHY 1 NIGHT      MULTIPLE SLEEP LATENCY TEST   2. Fibromyalgia M79.7 729.1    3. H/O: depression Z86.59 V11.8    4. Sleep paralysis G47.8 780.58    5. Parasomnia, unspecified type G47.50 307.47          Plan:       * The patient currently has a Minimal risk for having sleep apnea. STOP-BANG score 2.  * Since narcolepsy may present in this manner testing is advised. * PSG / MSLT was ordered for initial evaluation of sleep physiology and hypersomnia. Testing protocol including need for urine drug screen reviewed. * The need for cancelling the daytime testing in the event of certain night time findings was discussed. * Effect of medications on testing was discussed. * She has agreed to maintain a regular sleep schedule, practice good sleep hygiene and keep her sleep environment safe. * Patient is aware of the need to not start any new medications until testing is completed. * Patient is aware of the need to avoid driving or performing tasks requiring a high degree of vigilance in the presence of unintentional sleep attacks. * Patient agrees to telephone [phone]  follow-up by myself or lead sleep technologist shortly after sleep study to review results and plan final management.     (patient has given permission for a message to be left regarding test results and further management if patient cannot be cannot be reached directly). Thank you for allowing us to participate in your patient's medical care. We'll keep you updated on these investigations. Simon Miller MD, [facility]  Electronically signed.  01/17/20

## 2020-01-31 ENCOUNTER — TELEPHONE (OUTPATIENT)
Dept: SLEEP MEDICINE | Age: 50
End: 2020-01-31

## 2020-01-31 DIAGNOSIS — G47.33 OSA (OBSTRUCTIVE SLEEP APNEA): Primary | ICD-10-CM

## 2020-01-31 NOTE — TELEPHONE ENCOUNTER
Orders Placed This Encounter    SLEEP STUDY UNATTENDED, 4 CHANNEL     Order Specific Question:   Reason for Exam     Answer:   JANNETH

## 2020-01-31 NOTE — TELEPHONE ENCOUNTER
Patient called stating she would like to proceed with a HSAT , first instead of a PSG and MSLT because of the cost , she said she would be checking with her insurance about the CPT codes. She would like to speak with Dr. Marcelina Cornelius. Please Advise.

## 2020-02-20 ENCOUNTER — TELEPHONE (OUTPATIENT)
Dept: SLEEP MEDICINE | Age: 50
End: 2020-02-20

## 2020-02-20 NOTE — TELEPHONE ENCOUNTER
Patient called stating she would like a cost estimation for the HSAT scheduled for tomorrow. Please Advise.

## 2020-02-21 ENCOUNTER — HOSPITAL ENCOUNTER (OUTPATIENT)
Dept: SLEEP MEDICINE | Age: 50
Discharge: HOME | End: 2020-02-21
Payer: COMMERCIAL

## 2020-02-21 ENCOUNTER — OFFICE VISIT (OUTPATIENT)
Dept: SLEEP MEDICINE | Age: 50
End: 2020-02-21

## 2020-02-21 VITALS
HEART RATE: 69 BPM | WEIGHT: 162 LBS | OXYGEN SATURATION: 98 % | BODY MASS INDEX: 24.55 KG/M2 | DIASTOLIC BLOOD PRESSURE: 62 MMHG | HEIGHT: 68 IN | SYSTOLIC BLOOD PRESSURE: 109 MMHG

## 2020-02-21 DIAGNOSIS — G47.33 OSA (OBSTRUCTIVE SLEEP APNEA): Primary | ICD-10-CM

## 2020-02-21 DIAGNOSIS — Z86.59 H/O: DEPRESSION: ICD-10-CM

## 2020-02-21 DIAGNOSIS — M79.7 FIBROMYALGIA: ICD-10-CM

## 2020-02-21 DIAGNOSIS — G47.50 PARASOMNIA, UNSPECIFIED TYPE: ICD-10-CM

## 2020-02-21 DIAGNOSIS — G47.8 SLEEP PARALYSIS: ICD-10-CM

## 2020-02-21 DIAGNOSIS — G47.419 NARCOLEPSY WITHOUT CATAPLEXY: ICD-10-CM

## 2020-02-21 PROCEDURE — 95806 SLEEP STUDY UNATT&RESP EFFT: CPT | Performed by: INTERNAL MEDICINE

## 2020-02-21 NOTE — PROGRESS NOTES
217 [facility] [place]. [place], [address]  Tel.  [phone]  Fax. 100 [facility] 60  [place], [address] [facility]  Tel.  [phone]  Fax. [phone] [address] [facility] [address] [place]   Tel.  [phone]  Fax. [phone]       S>Flavia Lara is a 44 y.o. female seen today to receive a home sleep testing unit (HST). · Patient was educated on proper hookup and operation of the HST. · Instruction forms and documentation were reviewed and signed. · The patient demonstrated good understanding of the HST. O>    Visit Vitals  /62 (BP 1 Location: Left arm, BP Patient Position: Sitting)   Pulse 69   Ht 5' 8\" (1.727 m)   Wt 162 lb (73.5 kg)   SpO2 98%   BMI 24.63 kg/m²         A>  1. JANNETH (obstructive sleep apnea)    2. Narcolepsy without cataplexy    3. Fibromyalgia    4. H/O: depression    5. Sleep paralysis    6. Parasomnia, unspecified type          P>  · General information regarding operations and maintenance of the device was provided. · She was provided information on sleep apnea including coresponding risk factors and the importance of proper treatment. · Follow-up appointment was made to return the HST. She will be contacted once the results have been reviewed. · She was asked to contact our office for any problems regarding her home sleep test study.

## 2020-02-26 ENCOUNTER — TELEPHONE (OUTPATIENT)
Dept: SLEEP MEDICINE | Age: 50
End: 2020-02-26

## 2020-02-27 NOTE — TELEPHONE ENCOUNTER
M[facility] Morales is to be contacted by lead sleep technologist regarding results of Sleep Testing which was indicative of an average AHI of 2.2 / 3.4  per hour with an SpO2 garry of 89 / 88% and SpO2 of < 88% being 0 / 0 minutes. Repeat testing is to be considered if symptoms particularly if sleepiness persist or worsen over time.

## 2020-03-24 ENCOUNTER — TELEPHONE (OUTPATIENT)
Dept: SLEEP MEDICINE | Age: 50
End: 2020-03-24

## 2020-03-24 NOTE — TELEPHONE ENCOUNTER
Patient called stated , she did a HSAT , and will not be doing any In-Lab studies. CANCELED IN-LAB STUDIES.

## 2021-04-16 ENCOUNTER — TRANSCRIBE ORDER (OUTPATIENT)
Dept: SCHEDULING | Age: 51
End: 2021-04-16

## 2021-04-19 ENCOUNTER — TRANSCRIBE ORDER (OUTPATIENT)
Dept: SCHEDULING | Age: 51
End: 2021-04-19

## 2021-04-19 DIAGNOSIS — R10.11 RUQ PAIN: ICD-10-CM

## 2021-04-19 DIAGNOSIS — R14.0 BLOATING: ICD-10-CM

## 2021-04-19 DIAGNOSIS — K63.89 SMALL INTESTINAL BACTERIAL OVERGROWTH: Primary | ICD-10-CM

## 2021-04-20 ENCOUNTER — TRANSCRIBE ORDER (OUTPATIENT)
Dept: SCHEDULING | Age: 51
End: 2021-04-20

## 2021-04-20 DIAGNOSIS — K63.89 INTESTINAL BACTERIAL OVERGROWTH: Primary | ICD-10-CM

## 2021-04-20 DIAGNOSIS — R14.0 BLOATING: ICD-10-CM

## 2021-04-20 DIAGNOSIS — R10.11 RUQ PAIN: ICD-10-CM

## 2022-10-31 ENCOUNTER — TRANSCRIBE ORDER (OUTPATIENT)
Dept: SCHEDULING | Age: 52
End: 2022-10-31

## 2022-10-31 DIAGNOSIS — R11.2 NAUSEA AND/OR VOMITING: Primary | ICD-10-CM

## 2023-01-26 ENCOUNTER — TRANSCRIBE ORDER (OUTPATIENT)
Dept: SCHEDULING | Age: 53
End: 2023-01-26

## 2023-01-30 ENCOUNTER — TRANSCRIBE ORDER (OUTPATIENT)
Dept: SCHEDULING | Age: 53
End: 2023-01-30

## 2023-01-30 DIAGNOSIS — R93.2 ABNORMAL LIVER DIAGNOSTIC IMAGING: Primary | ICD-10-CM
